# Patient Record
Sex: FEMALE | Race: WHITE | URBAN - METROPOLITAN AREA
[De-identification: names, ages, dates, MRNs, and addresses within clinical notes are randomized per-mention and may not be internally consistent; named-entity substitution may affect disease eponyms.]

---

## 2017-03-22 ENCOUNTER — APPOINTMENT (OUTPATIENT)
Dept: URBAN - METROPOLITAN AREA CLINIC 290 | Age: 37
Setting detail: DERMATOLOGY
End: 2017-03-23

## 2017-03-22 DIAGNOSIS — Z41.9 ENCOUNTER FOR PROCEDURE FOR PURPOSES OTHER THAN REMEDYING HEALTH STATE, UNSPECIFIED: ICD-10-CM

## 2017-03-22 PROCEDURE — OTHER BOTOX (U OR CC): OTHER

## 2017-03-22 PROCEDURE — OTHER DYSPORT (U OR CC): OTHER

## 2017-03-22 NOTE — PROCEDURE: BOTOX (U OR CC)
Additional Area 1 Units: 0
Dilution (U/0.1 Cc): 2.4
Additional Area 2 Location: [place]
Document As Units Or Cc?: units
Additional Area 1 Location: [place]
Consent: Verbal consent obtained. Risks include but not limited to lid/brow ptosis, bruising, swelling, diplopia, temporary effect, incomplete chemical denervation.
Additional Area 3 Location: Lip
Price (Use Numbers Only, No Special Characters Or $): 200.00
Glabellar Complex Units: 20
Additional Area 4 Location: Right lower lid
Detail Level: Zone

## 2017-03-22 NOTE — PROCEDURE: DYSPORT (U OR CC)
Periorbital Skin Units: 0
Price (Use Numbers Only, No Special Characters Or $): 100.00
Forehead Units: 10
Post-Care Instructions: Patient instructed to not lie down for 4 hours and limit physical activity for 24 hours. Patient instructed not to travel by airplane for 48 hours.
Detail Level: Detailed
Dilution (U/ 0.1cc): 3
Additional Area 1 Location: [place]
Consent: Written consent obtained. Risks include but not limited to lid/brow ptosis, bruising, swelling, diplopia, temporary effect, incomplete chemical denervation.

## 2017-06-29 ENCOUNTER — APPOINTMENT (OUTPATIENT)
Dept: URBAN - METROPOLITAN AREA CLINIC 290 | Age: 37
Setting detail: DERMATOLOGY
End: 2017-06-29

## 2017-06-29 DIAGNOSIS — Z41.9 ENCOUNTER FOR PROCEDURE FOR PURPOSES OTHER THAN REMEDYING HEALTH STATE, UNSPECIFIED: ICD-10-CM

## 2017-06-29 PROCEDURE — OTHER BOTOX (U OR CC): OTHER

## 2017-06-29 NOTE — PROCEDURE: BOTOX (U OR CC)
Depressor Anguli Oris Units: 0
Detail Level: Zone
Dilution (U/0.1 Cc): 2.4
Additional Area 5 Location: Lip
Price (Use Numbers Only, No Special Characters Or $): 300
Consent: Verbal consent obtained. Risks include but not limited to lid/brow ptosis, bruising, swelling, diplopia, temporary effect, incomplete chemical denervation.
Additional Area 1 Location: [place] feet
Forehead Units/Cc: 10
Document As Units Or Cc?: units
Additional Area 6 Location: Jaw
Glabellar Complex Units: 20
Additional Area 4 Location: Right lower lid
Additional Area 2 Location: [place]

## 2017-09-12 ENCOUNTER — APPOINTMENT (OUTPATIENT)
Dept: URBAN - METROPOLITAN AREA CLINIC 290 | Age: 37
Setting detail: DERMATOLOGY
End: 2017-09-13

## 2017-09-12 DIAGNOSIS — Z12.83 ENCOUNTER FOR SCREENING FOR MALIGNANT NEOPLASM OF SKIN: ICD-10-CM

## 2017-09-12 DIAGNOSIS — D18.0 HEMANGIOMA: ICD-10-CM

## 2017-09-12 DIAGNOSIS — L82.1 OTHER SEBORRHEIC KERATOSIS: ICD-10-CM

## 2017-09-12 DIAGNOSIS — Z87.2 PERSONAL HISTORY OF DISEASES OF THE SKIN AND SUBCUTANEOUS TISSUE: ICD-10-CM

## 2017-09-12 PROBLEM — D18.01 HEMANGIOMA OF SKIN AND SUBCUTANEOUS TISSUE: Status: ACTIVE | Noted: 2017-09-12

## 2017-09-12 PROBLEM — D48.5 NEOPLASM OF UNCERTAIN BEHAVIOR OF SKIN: Status: ACTIVE | Noted: 2017-09-12

## 2017-09-12 PROCEDURE — OTHER OBSERVATION: OTHER

## 2017-09-12 PROCEDURE — OTHER REASSURANCE: OTHER

## 2017-09-12 PROCEDURE — 99214 OFFICE O/P EST MOD 30 MIN: CPT

## 2017-09-12 PROCEDURE — OTHER COUNSELING: OTHER

## 2017-09-12 ASSESSMENT — LOCATION ZONE DERM
LOCATION ZONE: LEG
LOCATION ZONE: ARM
LOCATION ZONE: TRUNK

## 2017-09-12 ASSESSMENT — LOCATION DETAILED DESCRIPTION DERM
LOCATION DETAILED: RIGHT ANTERIOR MEDIAL PROXIMAL UPPER ARM
LOCATION DETAILED: LEFT ANTERIOR PROXIMAL THIGH
LOCATION DETAILED: RIGHT AREOLA
LOCATION DETAILED: RIGHT INFERIOR LATERAL LOWER BACK

## 2017-09-12 ASSESSMENT — LOCATION SIMPLE DESCRIPTION DERM
LOCATION SIMPLE: RIGHT UPPER ARM
LOCATION SIMPLE: RIGHT BREAST
LOCATION SIMPLE: RIGHT LOWER BACK
LOCATION SIMPLE: LEFT THIGH

## 2017-09-19 ENCOUNTER — APPOINTMENT (OUTPATIENT)
Dept: URBAN - METROPOLITAN AREA CLINIC 290 | Age: 37
Setting detail: DERMATOLOGY
End: 2017-11-24

## 2017-09-19 DIAGNOSIS — Z41.9 ENCOUNTER FOR PROCEDURE FOR PURPOSES OTHER THAN REMEDYING HEALTH STATE, UNSPECIFIED: ICD-10-CM

## 2017-09-19 PROCEDURE — OTHER DYSPORT (U OR CC): OTHER

## 2017-09-19 PROCEDURE — OTHER BOTOX: OTHER

## 2017-09-19 NOTE — PROCEDURE: BOTOX
Additional Area 2 Location: [facility] Additional Area 2 Location: [facility]

## 2017-09-19 NOTE — PROCEDURE: DYSPORT (U OR CC)
Inferior Lateral Masseter Units: 0
Dilution (U/ 0.1cc): 3
Detail Level: Detailed
Forehead Units: 10
Additional Area 1 Location: [place]abella
Consent: Written consent obtained. Risks include but not limited to lid/brow ptosis, bruising, swelling, diplopia, temporary effect, incomplete chemical denervation.
Price (Use Numbers Only, No Special Characters Or $): 100
Post-Care Instructions: Patient instructed to not lie down for 4 hours and limit physical activity for 24 hours. Patient instructed not to travel by airplane for 48 hours.

## 2018-03-20 ENCOUNTER — APPOINTMENT (OUTPATIENT)
Dept: URBAN - METROPOLITAN AREA CLINIC 290 | Age: 38
Setting detail: DERMATOLOGY
End: 2018-03-28

## 2018-03-20 DIAGNOSIS — Z41.9 ENCOUNTER FOR PROCEDURE FOR PURPOSES OTHER THAN REMEDYING HEALTH STATE, UNSPECIFIED: ICD-10-CM

## 2018-03-20 PROCEDURE — OTHER BOTOX (U OR CC): OTHER

## 2018-03-20 PROCEDURE — OTHER OTHER (COSMETIC): OTHER

## 2018-03-20 NOTE — PROCEDURE: OTHER (COSMETIC)
Other (Free Text): Per Dr. Gonzalez. patient will receive 3 syringes of filler for $1,200\\nType of filler not specified
Detail Level: Detailed

## 2018-03-20 NOTE — PROCEDURE: BOTOX (U OR CC)
Additional Area 4 Location: [facility] Additional Area 4 Location: [facility]

## 2018-09-11 ENCOUNTER — APPOINTMENT (OUTPATIENT)
Dept: URBAN - METROPOLITAN AREA CLINIC 290 | Age: 38
Setting detail: DERMATOLOGY
End: 2018-09-13

## 2018-09-11 DIAGNOSIS — Z41.9 ENCOUNTER FOR PROCEDURE FOR PURPOSES OTHER THAN REMEDYING HEALTH STATE, UNSPECIFIED: ICD-10-CM

## 2018-09-11 PROCEDURE — OTHER DYSPORT (U OR CC): OTHER

## 2018-09-11 NOTE — PROCEDURE: DYSPORT (U OR CC)
Additional Area 2 Location: [facility] Additional Area 2 Location: [facility]

## 2019-03-13 ENCOUNTER — APPOINTMENT (OUTPATIENT)
Dept: URBAN - METROPOLITAN AREA CLINIC 290 | Age: 39
Setting detail: DERMATOLOGY
End: 2019-03-17

## 2019-03-13 DIAGNOSIS — Z41.9 ENCOUNTER FOR PROCEDURE FOR PURPOSES OTHER THAN REMEDYING HEALTH STATE, UNSPECIFIED: ICD-10-CM

## 2019-03-13 PROCEDURE — OTHER BOTOX (U OR CC): OTHER

## 2019-03-13 PROCEDURE — OTHER COSMETIC CONSULTATION: FILLERS: OTHER

## 2019-03-13 ASSESSMENT — LOCATION SIMPLE DESCRIPTION DERM
LOCATION SIMPLE: LEFT EYEBROW
LOCATION SIMPLE: LEFT CHEEK
LOCATION SIMPLE: RIGHT FOREHEAD
LOCATION SIMPLE: LEFT FOREHEAD
LOCATION SIMPLE: RIGHT EYEBROW
LOCATION SIMPLE: RIGHT TEMPLE
LOCATION SIMPLE: LEFT TEMPLE
LOCATION SIMPLE: RIGHT CHEEK

## 2019-03-13 ASSESSMENT — LOCATION DETAILED DESCRIPTION DERM
LOCATION DETAILED: RIGHT INFERIOR LATERAL FOREHEAD
LOCATION DETAILED: LEFT INFERIOR LATERAL FOREHEAD
LOCATION DETAILED: LEFT INFERIOR TEMPLE
LOCATION DETAILED: RIGHT INFERIOR TEMPLE
LOCATION DETAILED: LEFT CENTRAL MALAR CHEEK
LOCATION DETAILED: LEFT CENTRAL EYEBROW
LOCATION DETAILED: RIGHT CENTRAL EYEBROW
LOCATION DETAILED: RIGHT CENTRAL MALAR CHEEK

## 2019-03-13 ASSESSMENT — LOCATION ZONE DERM: LOCATION ZONE: FACE

## 2019-03-13 NOTE — PROCEDURE: BOTOX (U OR CC)
Additional Area 4 Location: [facility] Additional Area 4 Location: [facility]

## 2019-08-09 ENCOUNTER — APPOINTMENT (OUTPATIENT)
Dept: URBAN - METROPOLITAN AREA CLINIC 290 | Age: 39
Setting detail: DERMATOLOGY
End: 2019-08-16

## 2019-08-09 DIAGNOSIS — Z41.9 ENCOUNTER FOR PROCEDURE FOR PURPOSES OTHER THAN REMEDYING HEALTH STATE, UNSPECIFIED: ICD-10-CM

## 2019-08-09 PROCEDURE — OTHER OTHER (COSMETIC): OTHER

## 2019-08-09 PROCEDURE — OTHER BOTOX (U OR CC): OTHER

## 2019-08-09 PROCEDURE — OTHER JEUVEAU: OTHER

## 2019-08-09 NOTE — PROCEDURE: JEUVEAU
Glabellar Complex Units: 0
Detail Level: Detailed
Additional Area 1 Units: 20
Consent: Written consent obtained. Risks include but not limited to lid/brow ptosis, bruising, swelling, diplopia, temporary effect, incomplete chemical denervation.
Dilution (U/0.1 Cc): 4
Post-Care Instructions: Patient instructed to not lie down for 4 hours and limit physical activity for 24 hours.
Additional Area 1 Location: [place]abella
Price (Use Numbers Only, No Special Characters Or $): 200

## 2019-08-09 NOTE — PROCEDURE: BOTOX (U OR CC)
Additional Area 4 Location: [facility] Additional Area 4 Location: [facility]

## 2019-08-09 NOTE — PROCEDURE: OTHER (COSMETIC)
Detail Level: Zone
Other (Free Text): Discussed she may need another 1/2 syringe of Botox in the forehead. \\nBriefly discussed  the temples, cheeks at next visit

## 2019-11-04 ENCOUNTER — APPOINTMENT (OUTPATIENT)
Dept: URBAN - METROPOLITAN AREA CLINIC 290 | Age: 39
Setting detail: DERMATOLOGY
End: 2019-11-10

## 2019-11-04 DIAGNOSIS — Z41.9 ENCOUNTER FOR PROCEDURE FOR PURPOSES OTHER THAN REMEDYING HEALTH STATE, UNSPECIFIED: ICD-10-CM

## 2019-11-04 PROCEDURE — OTHER BOTOX (U OR CC): OTHER

## 2019-11-04 NOTE — PROCEDURE: BOTOX (U OR CC)
Additional Area 4 Location: [facility] Additional Area 4 Location: [facility]

## 2019-11-04 NOTE — PROCEDURE: BOTOX (U OR CC)
PT ARRIVED ON FLOOR AT 0150. SBP WAS ELEVATED . PT AT THAT TIME DENIED
PAIN AND HAD NO OTHER CONCERNS. Document As Units Or Cc?: units

## 2020-05-13 ENCOUNTER — APPOINTMENT (OUTPATIENT)
Dept: URBAN - METROPOLITAN AREA CLINIC 283 | Age: 40
Setting detail: DERMATOLOGY
End: 2020-05-13

## 2020-05-13 DIAGNOSIS — L82.1 OTHER SEBORRHEIC KERATOSIS: ICD-10-CM

## 2020-05-13 DIAGNOSIS — D22 MELANOCYTIC NEVI: ICD-10-CM

## 2020-05-13 DIAGNOSIS — L24 IRRITANT CONTACT DERMATITIS: ICD-10-CM

## 2020-05-13 DIAGNOSIS — Z12.83 ENCOUNTER FOR SCREENING FOR MALIGNANT NEOPLASM OF SKIN: ICD-10-CM

## 2020-05-13 DIAGNOSIS — Z87.2 PERSONAL HISTORY OF DISEASES OF THE SKIN AND SUBCUTANEOUS TISSUE: ICD-10-CM

## 2020-05-13 PROBLEM — D22.5 MELANOCYTIC NEVI OF TRUNK: Status: ACTIVE | Noted: 2020-05-13

## 2020-05-13 PROBLEM — L24.9 IRRITANT CONTACT DERMATITIS, UNSPECIFIED CAUSE: Status: ACTIVE | Noted: 2020-05-13

## 2020-05-13 PROCEDURE — OTHER OBSERVATION: OTHER

## 2020-05-13 PROCEDURE — OTHER COUNSELING: OTHER

## 2020-05-13 PROCEDURE — OTHER PRESCRIPTION: OTHER

## 2020-05-13 PROCEDURE — 11102 TANGNTL BX SKIN SINGLE LES: CPT

## 2020-05-13 PROCEDURE — OTHER BIOPSY BY SHAVE METHOD: OTHER

## 2020-05-13 PROCEDURE — 99214 OFFICE O/P EST MOD 30 MIN: CPT | Mod: 25

## 2020-05-13 ASSESSMENT — LOCATION SIMPLE DESCRIPTION DERM
LOCATION SIMPLE: ABDOMEN
LOCATION SIMPLE: RIGHT UPPER BACK
LOCATION SIMPLE: RIGHT BREAST
LOCATION SIMPLE: LEFT UPPER BACK
LOCATION SIMPLE: RIGHT LOWER BACK

## 2020-05-13 ASSESSMENT — LOCATION DETAILED DESCRIPTION DERM
LOCATION DETAILED: RIGHT LATERAL UPPER BACK
LOCATION DETAILED: RIGHT MEDIAL BREAST 2-3:00 REGION
LOCATION DETAILED: RIGHT RIB CAGE
LOCATION DETAILED: LEFT RIB CAGE
LOCATION DETAILED: LEFT MID-UPPER BACK
LOCATION DETAILED: RIGHT INFERIOR LATERAL LOWER BACK

## 2020-05-13 ASSESSMENT — LOCATION ZONE DERM: LOCATION ZONE: TRUNK

## 2020-05-13 NOTE — PROCEDURE: BIOPSY BY SHAVE METHOD
Detail Level: Detailed
Silver Nitrate Text: The wound bed was treated with silver nitrate after the biopsy was performed.
Type Of Destruction Used: Curettage
Curettage Text: The wound bed was treated with curettage after the biopsy was performed.
Hide Additional Anticipated Plan?: No
Depth Of Biopsy: dermis
Anesthesia Type: 1% lidocaine with epinephrine
Cryotherapy Text: The wound bed was treated with cryotherapy after the biopsy was performed.
Electrodesiccation Text: The wound bed was treated with electrodesiccation after the biopsy was performed.
Post-Care Instructions: I reviewed with the patient in detail post-care instructions. Patient is to keep the biopsy site dry overnight, and then apply bacitracin twice daily until healed. Patient may apply hydrogen peroxide soaks to remove any crusting.
Anesthesia Volume In Cc (Will Not Render If 0): 0.5
Size Of Lesion In Cm: 0.4
Biopsy Type: H and E
Dressing: bandage
Wound Care: Petrolatum
Path Notes (To The Dermatopathologist): 4mm x 5mm \\Lelia. Zabrina
Information: Selecting Yes will display possible errors in your note based on the variables you have selected. This validation is only offered as a suggestion for you. PLEASE NOTE THAT THE VALIDATION TEXT WILL BE REMOVED WHEN YOU FINALIZE YOUR NOTE. IF YOU WANT TO FAX A PRELIMINARY NOTE YOU WILL NEED TO TOGGLE THIS TO 'NO' IF YOU DO NOT WANT IT IN YOUR FAXED NOTE.
Electrodesiccation And Curettage Text: The wound bed was treated with electrodesiccation and curettage after the biopsy was performed.
Billing Type: Third-Party Bill
Biopsy Method: Dermablade
Hemostasis: Drysol
Was A Bandage Applied: Yes
Notification Instructions: Patient will be notified of biopsy results. However, patient instructed to call the office if not contacted within 2 weeks.
Additional Anesthesia Volume In Cc (Will Not Render If 0): 0
Consent: Written consent was obtained and risks were reviewed including but not limited to scarring, infection, bleeding, scabbing, incomplete removal, nerve damage and allergy to anesthesia.

## 2020-06-25 ENCOUNTER — APPOINTMENT (OUTPATIENT)
Dept: URBAN - METROPOLITAN AREA CLINIC 231 | Age: 40
Setting detail: DERMATOLOGY
End: 2020-06-25

## 2020-06-25 DIAGNOSIS — Z41.9 ENCOUNTER FOR PROCEDURE FOR PURPOSES OTHER THAN REMEDYING HEALTH STATE, UNSPECIFIED: ICD-10-CM

## 2020-06-25 PROCEDURE — OTHER BOTOX (U OR CC): OTHER

## 2020-06-25 NOTE — PROCEDURE: BOTOX (U OR CC)
[facility] Additional Area 1 Location: [facility]

## 2020-08-12 ENCOUNTER — APPOINTMENT (OUTPATIENT)
Dept: URBAN - METROPOLITAN AREA CLINIC 231 | Age: 40
Setting detail: DERMATOLOGY
End: 2020-08-12

## 2020-08-12 DIAGNOSIS — D22 MELANOCYTIC NEVI: ICD-10-CM

## 2020-08-12 PROBLEM — D22.5 MELANOCYTIC NEVI OF TRUNK: Status: ACTIVE | Noted: 2020-08-12

## 2020-08-12 PROCEDURE — OTHER OBSERVATION: OTHER

## 2020-08-12 PROCEDURE — 99213 OFFICE O/P EST LOW 20 MIN: CPT

## 2020-08-12 ASSESSMENT — LOCATION SIMPLE DESCRIPTION DERM
LOCATION SIMPLE: RIGHT UPPER BACK
LOCATION SIMPLE: RIGHT LOWER BACK
LOCATION SIMPLE: ABDOMEN

## 2020-08-12 ASSESSMENT — LOCATION DETAILED DESCRIPTION DERM
LOCATION DETAILED: RIGHT LATERAL UPPER BACK
LOCATION DETAILED: RIGHT SUPERIOR LATERAL LOWER BACK
LOCATION DETAILED: RIGHT RIB CAGE

## 2020-08-12 ASSESSMENT — LOCATION ZONE DERM: LOCATION ZONE: TRUNK

## 2020-10-07 ENCOUNTER — APPOINTMENT (OUTPATIENT)
Dept: URBAN - METROPOLITAN AREA CLINIC 231 | Age: 40
Setting detail: DERMATOLOGY
End: 2020-10-17

## 2020-10-07 DIAGNOSIS — Z41.9 ENCOUNTER FOR PROCEDURE FOR PURPOSES OTHER THAN REMEDYING HEALTH STATE, UNSPECIFIED: ICD-10-CM

## 2020-10-07 PROCEDURE — OTHER BOTOX: OTHER

## 2020-10-07 PROCEDURE — OTHER FILLERS: OTHER

## 2020-10-07 ASSESSMENT — LOCATION DETAILED DESCRIPTION DERM
LOCATION DETAILED: LEFT CENTRAL MALAR CHEEK
LOCATION DETAILED: INFERIOR MID FOREHEAD

## 2020-10-07 ASSESSMENT — LOCATION ZONE DERM: LOCATION ZONE: FACE

## 2020-10-07 ASSESSMENT — LOCATION SIMPLE DESCRIPTION DERM
LOCATION SIMPLE: INFERIOR FOREHEAD
LOCATION SIMPLE: LEFT CHEEK

## 2020-10-07 NOTE — PROCEDURE: BOTOX
Show Forehead Units: Yes
Show Right And Left Brow Units: No
Periorbital Skin Units: 0
Additional Area 1 Location: [place]
Consent: Written consent obtained. Risks include but not limited to lid/brow ptosis, bruising, swelling, diplopia, temporary effect, incomplete chemical denervation.
Additional Area 3 Location: L masseter
Additional Area 2 Location: R masseter
Additional Area 3 Units: 20
Price (Use Numbers Only, No Special Characters Or $): 500
Detail Level: Detailed
Dilution (U/0.1 Cc): 2.4
Post-Care Instructions: Patient instructed to not lie down for 4 hours and limit physical activity for 24 hours.
Additional Area 2 Units: 10

## 2020-11-24 ENCOUNTER — APPOINTMENT (OUTPATIENT)
Dept: URBAN - METROPOLITAN AREA CLINIC 231 | Age: 40
Setting detail: DERMATOLOGY
End: 2020-11-28

## 2020-11-24 DIAGNOSIS — Z41.9 ENCOUNTER FOR PROCEDURE FOR PURPOSES OTHER THAN REMEDYING HEALTH STATE, UNSPECIFIED: ICD-10-CM

## 2020-11-24 PROCEDURE — OTHER JEUVEAU (U OR CC): OTHER

## 2020-11-24 PROCEDURE — OTHER BOTOX (U OR CC): OTHER

## 2020-11-24 PROCEDURE — OTHER MIPS QUALITY: OTHER

## 2020-11-24 NOTE — PROCEDURE: JEUVEAU (U OR CC)
Additional Area 1 Location: [facility] Additional Area 1 Location: [facility]

## 2020-11-24 NOTE — PROCEDURE: BOTOX (U OR CC)
Forehead Units/Cc: 20
Additional Area 4 Units: 0
Price (Use Numbers Only, No Special Characters Or $): 200
Document As Units Or Cc?: units
Dilution (U/0.1 Cc): 4
Post-Care Instructions: Patient instructed to not lie down for 4 hours and limit physical activity for 24 hours.
Consent: Written consent obtained. Risks include but not limited to lid/brow ptosis, bruising, swelling, diplopia, temporary effect, incomplete chemical denervation.
Detail Level: Detailed
Additional Area 1 Location: 2.5 units
Including Pricing Information In The Note: No

## 2021-11-10 ENCOUNTER — APPOINTMENT (OUTPATIENT)
Dept: URBAN - METROPOLITAN AREA CLINIC 231 | Age: 41
Setting detail: DERMATOLOGY
End: 2021-11-13

## 2021-11-10 DIAGNOSIS — D22 MELANOCYTIC NEVI: ICD-10-CM

## 2021-11-10 DIAGNOSIS — D49.2 NEOPLASM OF UNSPECIFIED BEHAVIOR OF BONE, SOFT TISSUE, AND SKIN: ICD-10-CM

## 2021-11-10 DIAGNOSIS — L81.4 OTHER MELANIN HYPERPIGMENTATION: ICD-10-CM

## 2021-11-10 PROBLEM — D22.5 MELANOCYTIC NEVI OF TRUNK: Status: ACTIVE | Noted: 2021-11-10

## 2021-11-10 PROBLEM — D22.62 MELANOCYTIC NEVI OF LEFT UPPER LIMB, INCLUDING SHOULDER: Status: ACTIVE | Noted: 2021-11-10

## 2021-11-10 PROCEDURE — 11102 TANGNTL BX SKIN SINGLE LES: CPT

## 2021-11-10 PROCEDURE — 99213 OFFICE O/P EST LOW 20 MIN: CPT | Mod: 25

## 2021-11-10 PROCEDURE — OTHER MIPS QUALITY: OTHER

## 2021-11-10 PROCEDURE — OTHER BIOPSY BY SHAVE METHOD: OTHER

## 2021-11-10 PROCEDURE — OTHER COUNSELING: OTHER

## 2021-11-10 ASSESSMENT — LOCATION DETAILED DESCRIPTION DERM
LOCATION DETAILED: LEFT RIB CAGE
LOCATION DETAILED: RIGHT SUPERIOR MEDIAL FOREHEAD
LOCATION DETAILED: LEFT PROXIMAL POSTERIOR UPPER ARM
LOCATION DETAILED: RIGHT SUPERIOR UPPER BACK
LOCATION DETAILED: RIGHT CLAVICULAR SKIN
LOCATION DETAILED: RIGHT INFERIOR UPPER BACK

## 2021-11-10 ASSESSMENT — LOCATION ZONE DERM
LOCATION ZONE: FACE
LOCATION ZONE: ARM
LOCATION ZONE: TRUNK

## 2021-11-10 ASSESSMENT — LOCATION SIMPLE DESCRIPTION DERM
LOCATION SIMPLE: RIGHT UPPER BACK
LOCATION SIMPLE: RIGHT CLAVICULAR SKIN
LOCATION SIMPLE: LEFT POSTERIOR UPPER ARM
LOCATION SIMPLE: ABDOMEN
LOCATION SIMPLE: RIGHT FOREHEAD

## 2021-11-10 NOTE — PROCEDURE: BIOPSY BY SHAVE METHOD
Was A Bandage Applied: Yes
Wound Care: Petrolatum
Biopsy Type: H and E
Type Of Destruction Used: Curettage
Render Post-Care Instructions In Note?: no
X Size Of Lesion In Cm: 0
Cryotherapy Text: The wound bed was treated with cryotherapy after the biopsy was performed.
Notification Instructions: Patient will be notified of biopsy results. However, patient instructed to call the office if not contacted within 2 weeks.
Anesthesia Volume In Cc (Will Not Render If 0): 0.5
Depth Of Biopsy: dermis
Hemostasis: Drysol
Silver Nitrate Text: The wound bed was treated with silver nitrate after the biopsy was performed.
Billing Type: Third-Party Bill
Electrodesiccation Text: The wound bed was treated with electrodesiccation after the biopsy was performed.
Consent: Written consent was obtained and risks were reviewed including but not limited to scarring, infection, bleeding, scabbing, incomplete removal, nerve damage and allergy to anesthesia.
Detail Level: Detailed
Dressing: bandage
Biopsy Method: Dermablade
Anesthesia Type: 1% lidocaine with epinephrine
Curettage Text: The wound bed was treated with curettage after the biopsy was performed.
Post-Care Instructions: I reviewed with the patient in detail post-care instructions. Patient is to keep the biopsy site dry overnight, and then apply bacitracin twice daily until healed. Patient may apply hydrogen peroxide soaks to remove any crusting.
Information: Selecting Yes will display possible errors in your note based on the variables you have selected. This validation is only offered as a suggestion for you. PLEASE NOTE THAT THE VALIDATION TEXT WILL BE REMOVED WHEN YOU FINALIZE YOUR NOTE. IF YOU WANT TO FAX A PRELIMINARY NOTE YOU WILL NEED TO TOGGLE THIS TO 'NO' IF YOU DO NOT WANT IT IN YOUR FAXED NOTE.
Electrodesiccation And Curettage Text: The wound bed was treated with electrodesiccation and curettage after the biopsy was performed.

## 2022-03-16 ENCOUNTER — APPOINTMENT (OUTPATIENT)
Dept: URBAN - METROPOLITAN AREA CLINIC 231 | Age: 42
Setting detail: DERMATOLOGY
End: 2022-04-19

## 2022-03-16 DIAGNOSIS — Z41.9 ENCOUNTER FOR PROCEDURE FOR PURPOSES OTHER THAN REMEDYING HEALTH STATE, UNSPECIFIED: ICD-10-CM

## 2022-03-16 PROCEDURE — OTHER JEUVEAU (U OR CC): OTHER

## 2022-03-16 PROCEDURE — OTHER FILLERS: OTHER

## 2022-03-16 NOTE — PROCEDURE: JEUVEAU (U OR CC)
Price (Use Numbers Only, No Special Characters Or $): 786 Price (Use Numbers Only, No Special Characters Or $): 281

## 2022-03-16 NOTE — PROCEDURE: FILLERS
Price (Use Numbers Only, No Special Characters Or $): 940 Price (Use Numbers Only, No Special Characters Or $): 215

## 2022-08-16 ENCOUNTER — APPOINTMENT (OUTPATIENT)
Dept: URBAN - METROPOLITAN AREA CLINIC 231 | Age: 42
Setting detail: DERMATOLOGY
End: 2022-09-06

## 2022-08-16 DIAGNOSIS — Z41.9 ENCOUNTER FOR PROCEDURE FOR PURPOSES OTHER THAN REMEDYING HEALTH STATE, UNSPECIFIED: ICD-10-CM

## 2022-08-16 PROCEDURE — OTHER ADDITIONAL NOTES: OTHER

## 2022-08-16 PROCEDURE — OTHER BOTOX (U OR CC): OTHER

## 2022-08-16 NOTE — PROCEDURE: BOTOX (U OR CC)
Additional Area 2 Location: [facility] Additional Area 2 Location: [facility]

## 2022-08-16 NOTE — PROCEDURE: BOTOX (U OR CC)
Price (Use Numbers Only, No Special Characters Or $): 749 Price (Use Numbers Only, No Special Characters Or $): 242

## 2022-08-16 NOTE — PROCEDURE: BOTOX (U OR CC)
Additional Area 1 Location: [facility] Additional Area 1 Location: [facility]

## 2022-09-20 ENCOUNTER — APPOINTMENT (OUTPATIENT)
Dept: URBAN - METROPOLITAN AREA CLINIC 231 | Age: 42
Setting detail: DERMATOLOGY
End: 2022-09-21

## 2022-09-20 DIAGNOSIS — Z41.9 ENCOUNTER FOR PROCEDURE FOR PURPOSES OTHER THAN REMEDYING HEALTH STATE, UNSPECIFIED: ICD-10-CM

## 2022-09-20 PROCEDURE — OTHER BOTOX (U OR CC): OTHER

## 2022-09-20 PROCEDURE — OTHER ADDITIONAL NOTES: OTHER

## 2022-09-20 PROCEDURE — OTHER FILLERS: OTHER

## 2022-09-20 NOTE — PROCEDURE: ADDITIONAL NOTES
Additional Notes: Treated the forehead with 1 syringe of Botox (20U), patient only charged for half a syringe per Dr. Gonzalez. Explained to patient that we could only treat the upper forehead because going lower may cause the eyelids to droop.
Detail Level: Simple
Render Risk Assessment In Note?: no

## 2022-09-20 NOTE — PROCEDURE: BOTOX (U OR CC)
Additional Area 4 Units: 0
Including Pricing Information In The Note: No
Dilution (U/0.1 Cc): 4
Consent: Written consent obtained. Risks include but not limited to lid/brow ptosis, bruising, swelling, diplopia, temporary effect, incomplete chemical denervation.
Detail Level: Detailed
Document As Units Or Cc?: units
Post-Care Instructions: Patient instructed to not lie down for 4 hours and limit physical activity for 24 hours.
Additional Area 1 Location: 2.5 units
Forehead Units/Cc: 20
Price (Use Numbers Only, No Special Characters Or $): 130
